# Patient Record
Sex: MALE | Race: OTHER
[De-identification: names, ages, dates, MRNs, and addresses within clinical notes are randomized per-mention and may not be internally consistent; named-entity substitution may affect disease eponyms.]

---

## 2019-10-07 ENCOUNTER — HOSPITAL ENCOUNTER (OUTPATIENT)
Dept: HOSPITAL 74 - JER | Age: 49
Setting detail: OBSERVATION
LOS: 1 days | Discharge: HOME | End: 2019-10-08
Attending: INTERNAL MEDICINE | Admitting: INTERNAL MEDICINE
Payer: SELF-PAY

## 2019-10-07 VITALS — BODY MASS INDEX: 22 KG/M2

## 2019-10-07 DIAGNOSIS — I10: ICD-10-CM

## 2019-10-07 DIAGNOSIS — E78.5: ICD-10-CM

## 2019-10-07 DIAGNOSIS — K44.9: ICD-10-CM

## 2019-10-07 DIAGNOSIS — Z87.891: ICD-10-CM

## 2019-10-07 DIAGNOSIS — Z88.6: ICD-10-CM

## 2019-10-07 DIAGNOSIS — Z79.82: ICD-10-CM

## 2019-10-07 DIAGNOSIS — Z79.4: ICD-10-CM

## 2019-10-07 DIAGNOSIS — E11.9: ICD-10-CM

## 2019-10-07 DIAGNOSIS — K29.60: ICD-10-CM

## 2019-10-07 DIAGNOSIS — R07.89: Primary | ICD-10-CM

## 2019-10-07 LAB
ALBUMIN SERPL-MCNC: 4 G/DL (ref 3.4–5)
ALP SERPL-CCNC: 102 U/L (ref 45–117)
ALT SERPL-CCNC: 25 U/L (ref 13–61)
ANION GAP SERPL CALC-SCNC: 6 MMOL/L (ref 8–16)
AST SERPL-CCNC: 13 U/L (ref 15–37)
BASOPHILS # BLD: 0.3 % (ref 0–2)
BILIRUB SERPL-MCNC: 0.8 MG/DL (ref 0.2–1)
BUN SERPL-MCNC: 11.8 MG/DL (ref 7–18)
CALCIUM SERPL-MCNC: 9.2 MG/DL (ref 8.5–10.1)
CHLORIDE SERPL-SCNC: 103 MMOL/L (ref 98–107)
CO2 SERPL-SCNC: 30 MMOL/L (ref 21–32)
CREAT SERPL-MCNC: 0.7 MG/DL (ref 0.55–1.3)
DEPRECATED RDW RBC AUTO: 12.3 % (ref 11.9–15.9)
EOSINOPHIL # BLD: 0.7 % (ref 0–4.5)
GLUCOSE SERPL-MCNC: 76 MG/DL (ref 74–106)
HCT VFR BLD CALC: 45.3 % (ref 35.4–49)
HGB BLD-MCNC: 15.7 GM/DL (ref 11.7–16.9)
INR BLD: 1.07 (ref 0.83–1.09)
LYMPHOCYTES # BLD: 36.4 % (ref 8–40)
MCH RBC QN AUTO: 31.4 PG (ref 25.7–33.7)
MCHC RBC AUTO-ENTMCNC: 34.7 G/DL (ref 32–35.9)
MCV RBC: 90.4 FL (ref 80–96)
MONOCYTES # BLD AUTO: 7 % (ref 3.8–10.2)
NEUTROPHILS # BLD: 55.6 % (ref 42.8–82.8)
PLATELET # BLD AUTO: 175 K/MM3 (ref 134–434)
PMV BLD: 10.8 FL (ref 7.5–11.1)
POTASSIUM SERPLBLD-SCNC: 4.4 MMOL/L (ref 3.5–5.1)
PROT SERPL-MCNC: 7 G/DL (ref 6.4–8.2)
PT PNL PPP: 12.6 SEC (ref 9.7–13)
RBC # BLD AUTO: 5.01 M/MM3 (ref 4–5.6)
SODIUM SERPL-SCNC: 140 MMOL/L (ref 136–145)
WBC # BLD AUTO: 7.5 K/MM3 (ref 4–10)

## 2019-10-07 PROCEDURE — G0378 HOSPITAL OBSERVATION PER HR: HCPCS

## 2019-10-07 NOTE — PDOC
Attending Attestation





- Resident


Resident Name: José Manuel Linnth





- ED Attending Attestation


I have performed the following: I have examined & evaluated the patient, The 

case was reviewed & discussed with the resident, I agree w/resident's findings 

& plan, Exceptions are as noted





- HPI


HPI: 





10/07/19 15:32


Iron 49-year-old female male history of hypertension and diabetes here today 

complaining of palpitations chest pressure and chest pain.  Patient states he 

has been getting it often over the last few days.  States it is worse with 

certain movements such as going from a lying to a standing in addition to 

rolling over in his bed.  He describes capitation's or irregular heartbeats in 

addition to a pressure-like sensation.  Denies feeling short of breath no 

associated nausea or vomiting.  Has had a previous cardiac work-up including a 

stress test which he had one year and a half ago in [facility] and a 

previous Holter monitor.  His cardiologist is in [place].  Patient is here 

visiting currently for 11 days denies any leg swelling pain is not pleuritic no 

history of blood clots no family history of heart disease





- Physicial Exam


PE: 





10/07/19 15:34


Awake alert no acute distress lungs are clear bilaterally heart is regular with 

any murmurs rubs or gallops abdomen is soft nontender extremities are warm and 

well-perfused skin is warm and dry patient has 2+ symmetric pulses bilaterally 

neurologically is alert and oriented x3 skin is warm and dry





- Medical Decision Making





10/07/19 15:34


49-year-old male history of diabetes hypertension with a negative prior cardiac 

work-up 1 year and a half ago here today complaining of palpitations and chest 

pressure.  Plan CBC CMP troponin EKG chest x-ray.  Patient does have a recent 

negative work-up however will at least require a 4-hour troponin.  Aspirin 

given here in the ED EKG with normal sinus rhythm no ST elevation





**Heart Score/ECG Review





- History


History: Moderately suspicious





- Electrocardiogram


EKG: Non specific repolarization disturbance





- Age


Age: 45-65





- Risk Factors


Risk Factors Heart Score: Yes Hx Hypertension, Yes Hx Diabetes, Yes Smoking 

History


Based on the list above the patient has:: >/=3 risk factors or Hx 

atherosclerotic disease





- Troponin


Troponin: </= normal limit





- Score


Heart Score - Total: 5


  ** #1


ECG reviewed & interpreted by me at: 15:40


General ECG Interpretation: Sinus Rhythm, Normal Rate (61), Normal Intervals, 

No acute ischemic changes

## 2019-10-07 NOTE — EKG
Test Reason : 

Blood Pressure : ***/*** mmHG

Vent. Rate : 061 BPM     Atrial Rate : 061 BPM

   P-R Int : 150 ms          QRS Dur : 092 ms

    QT Int : 388 ms       P-R-T Axes : 061 071 067 degrees

   QTc Int : 390 ms

 

NORMAL SINUS RHYTHM

NORMAL ECG

NO PREVIOUS ECGS AVAILABLE

Confirmed by WILLIE WALLACE MD ([id number]) on 10/7/2019 4:12:44 PM

 

Referred By:             Confirmed By:WILLIE WALLACE MD

## 2019-10-07 NOTE — PDOC
History of Present Illness





- General


Chief Complaint: Palpitations


Stated Complaint: CHEST PAIN


Time Seen by Provider: 10/07/19 13:06





Past History





- Past Medical History


Allergies/Adverse Reactions: 


 Allergies











Allergy/AdvReac Type Severity Reaction Status Date / Time


 


tramadol Allergy   Verified 10/07/19 12:56











Home Medications: 


Ambulatory Orders





Aspirin 81 mg PO DAILY 10/07/19 


Insulin (Levemir) [Levemir Vial] 45 units SQ BID 10/07/19 


Losartan Potassium 50 mg PO DAILY 10/07/19 


Omeprazole 20 mg PO DAILY 10/07/19 








COPD: No


Diabetes: Yes


HTN: Yes





- Psycho Social/Smoking Cessation Hx


Smoking History: Current some day smoker


Have you smoked in the past 12 months: Yes


Number of Cigarettes Smoked Daily: 1


Information on smoking cessation initiated: No


Hx Alcohol Use: No


Drug/Substance Use Hx: No





*Physical Exam





- Vital Signs


 Last Vital Signs











Temp Pulse Resp BP Pulse Ox


 


 97.8 F   64   18   131/75   99 


 


 10/07/19 12:59  10/07/19 12:59  10/07/19 12:59  10/07/19 12:59  10/07/19 12:59














**Heart Score/ECG Review





- History


History: Moderately suspicious





- Electrocardiogram


EKG: Normal





- Age


Age: 45-65





- Risk Factors


Risk Factors Heart Score: Yes Hx Hypercholesterolemia, Yes Hx Hypertension, Yes 

Smoking History


Based on the list above the patient has:: >/=3 risk factors or Hx 

atherosclerotic disease





- Troponin


Troponin: </= normal limit





- Score


Heart Score - Total: 4





ED Treatment Course





- LABORATORY


CBC & Chemistry Diagram: 


 10/07/19 14:20





 10/07/19 14:40





Medical Decision Making





- Medical Decision Making


HPI: 


48yo M with PMH of HTN, HLD, smoking, esosive gastritis, hiatal hernia 

presenting with chest pain x 12 days. The pain is described as  "pressure" and 

rated 4/10. Patient also endorses tachycardia, lightheadedness, shortness of 

breath, and a sensation of a bounding pressure in his neck. He has had this 

sensation before but it is now more severe. Was evaluated in [place] about 17 

days ago with a negative workup. No hemoptysis, no recent surgical history, no 

recent immobilization, no hormone use, no history of DVT or PE. Denies fever or 

chils. 





PCP: in [place]





ROS:


Constitutional: no fever, no chills


HEENT: no throat pain, no dysphagia


Cardiovascular: +chest pain, +palpitations


Respiratory: no cough, +shortness of breath


Gastrointestinal: +abdominal pain, no nausea


Genitourinary: no dysuria, no hematuria


Musculoskeletal: no myalgia, no arthralgia


Skin: no rash, no itching


Neurologic: +lightheadedness, +weakness





PE: 


General: Awake, alert, and fully oriented, in no acute distress


Head: No signs of trauma


Eyes: EOMI, sclera anicteric


ENT: Moist mucus membranes


Neck: Normal ROM, supple


Lungs: Lungs clear, Normal breath sounds


Cardio: Regular rhythm, S1 and S2 present


Abdomen: Soft, nontender. No guarding, no rebound, no masses


Extremities: Normal range of motion, Distal pulses present, No calf tenderness


SKIN: Warm, Dry, normal turgor


Neurologic: Cranial nerves II through XII grossly intact. Normal speech





ED Course/MDM: 


DDX including but not limited to ACS, PE, PNA, anemia, metabolic derangement


Labs, EKG, CXR





EKG: rate 61, QTc 390. NSR


10/07/19 13:09





 CBC











WBC  7.5 K/mm3 (4.0-10.0)   10/07/19  14:20    


 


RBC  5.01 M/mm3 (4.00-5.60)   10/07/19  14:20    


 


Hgb  15.7 GM/dL (11.7-16.9)   10/07/19  14:20    


 


Hct  45.3 % (35.4-49)   10/07/19  14:20    


 


MCV  90.4 fl (80-96)   10/07/19  14:20    


 


MCH  31.4 pg (25.7-33.7)   10/07/19  14:20    


 


MCHC  34.7 g/dl (32.0-35.9)   10/07/19  14:20    


 


RDW  12.3 % (11.9-15.9)   10/07/19  14:20    


 


Plt Count  175 K/MM3 (134-434)   10/07/19  14:20    


 


MPV  10.8 fl (7.5-11.1)   10/07/19  14:20    


 


Absolute Neuts (auto)  4.2 K/mm3 (1.5-8.0)   10/07/19  14:20    


 


Neutrophils %  55.6 % (42.8-82.8)   10/07/19  14:20    


 


Lymphocytes %  36.4 % (8-40)   10/07/19  14:20    


 


Monocytes %  7.0 % (3.8-10.2)   10/07/19  14:20    


 


Eosinophils %  0.7 % (0-4.5)   10/07/19  14:20    


 


Basophils %  0.3 % (0-2.0)   10/07/19  14:20    


 


Nucleated RBC %  0 % (0-0)   10/07/19  14:20    








No leukocytosis





 CMP











Sodium  140 mmol/L (136-145)   10/07/19  14:40    


 


Potassium  4.4 mmol/L (3.5-5.1)   10/07/19  14:40    


 


Chloride  103 mmol/L ()   10/07/19  14:40    


 


Carbon Dioxide  30 mmol/L (21-32)   10/07/19  14:40    


 


Anion Gap  6 MMOL/L (8-16)  L  10/07/19  14:40    


 


BUN  11.8 mg/dL (7-18)   10/07/19  14:40    


 


Creatinine  0.7 mg/dL (0.55-1.3)   10/07/19  14:40    


 


Est GFR (CKD-EPI)AfAm  127.53   10/07/19  14:40    


 


Est GFR (CKD-EPI)NonAf  110.04   10/07/19  14:40    


 


Random Glucose  76 mg/dL ()   10/07/19  14:40    


 


Calcium  9.2 mg/dL (8.5-10.1)   10/07/19  14:40    


 


Total Bilirubin  0.8 mg/dL (0.2-1)   10/07/19  14:40    


 


AST  13 U/L (15-37)  L  10/07/19  14:40    


 


ALT  25 U/L (13-61)   10/07/19  14:40    


 


Alkaline Phosphatase  102 U/L ()   10/07/19  14:40    


 


Creatine Kinase  63 U/L ()   10/07/19  14:20    


 


Troponin I  < 0.02 ng/ml (0.00-0.05)   10/07/19  14:20    


 


Total Protein  7.0 g/dl (6.4-8.2)   10/07/19  14:40    


 


Albumin  4.0 g/dl (3.4-5.0)   10/07/19  14:40    


 


TSH  0.23 uIU/ml (0.358-3.74)  L  10/07/19  14:20    








Electrolytes unremarkable


Cr normal


Tpn undetectable


TSH is low


We will add on T3 and T4





Discussed case with Dr. Junior who accepted patient for telemetry observation 

under herself. 


10/07/19 16:27





CXR, as read by radiology: "There are no prior studies for comparison. There 

are clear lungs, normal mediastinum and sharp angles. The bones and soft 

tissues are intact. Impression: No acute chest pathology. "








Discharge





- Discharge Information


Problems reviewed: Yes


Clinical Impression/Diagnosis: 


Chest pain


Qualifiers:


 Chest pain type: unspecified Qualified Code(s): R07.9 - Chest pain, unspecified





Condition: Guarded


Disposition: HOME





- Admission


Yes





- Follow up/Referral





- Patient Discharge Instructions





- Post Discharge Activity

## 2019-10-08 VITALS — HEART RATE: 77 BPM | DIASTOLIC BLOOD PRESSURE: 79 MMHG | SYSTOLIC BLOOD PRESSURE: 110 MMHG

## 2019-10-08 VITALS — TEMPERATURE: 97.5 F

## 2019-10-08 NOTE — ECHO
Version:  1



Name:  JERMAIN YING                        Exam: Adult Echocardiogram

MRN:  [id number]                                       Study Date:  10/08/2019, 11:07 AM

Age:  49 Years



 ____________________________________________________________________________________________________
__________



MMode/2D Measurements & Calculations





IVSd: 0.88 cm                                          LVIDs: 2.7 cm

LVIDd: 4.5 cm

LVPWd: 0.97 cm

LVOT diam: 2.12 cm                                     Ao root diam: 2.7 cm

                                                       LA dimension: 2.9 cm



Doppler Measurements & Calculations



MV E max nir: 67.6 cm/sec                              Med E/e':  8.0

MV A max nir: 57.8 cm/sec                              Med Peak E' Nir:  8.5 cm/sec

MV E/A: 1.17

Lat E/e':  5.3

Lat Peak E' Nir:  12.7 cm/sec

Ao max P.8 mmHg                                    NYDIA(I,D): 2.8 cm

Ao mean P.48 mmHg                                  LV V1 mean: 58.1 cm/sec

Ao V2 max: 109.1 cm/sec                                LV V1 mean P.56 mmHg

                                                       TR max nir: 188.7 cm/sec

                                                       TR max P.5 mmHg



 Left Ventricle

 The left ventricular size, thickness and function are normal. Ejection Fraction = 70%.

 Right Ventricle

 The right ventricle is normal in size and function.

 Atria

 Normal left and right atrial size and function.

 Mitral Valve

 The mitral valve is normal in structure and function.

 Tricuspid Valve

 The tricuspid valve is normal in structure and function. There is mild tricuspid regurgitation.

 Aortic Valve

 The aortic valve is normal in structure and function. Trace aortic regurgitation.

 Pulmonic Valve

 The pulmonic valve is normal in structure and function.

 Great Vessels

 The aortic root is normal size. Normal aortic arch, descending and ascending aorta.

 Pericardium/Pleura

 There is no pericardial effusion.







 Summary Statements

 The left ventricular size, thickness and function are normal

 Ejection Fraction = 70%.

 The right ventricle is normal in size and function.

 Normal left and right atrial size and function.

 The mitral valve is normal in structure and function.

 The tricuspid valve is normal in structure and function.

 There is mild tricuspid regurgitation.

 The aortic valve is normal in structure and function.

 Trace aortic regurgitation.

 The pulmonic valve is normal in structure and function.

 The aortic root is normal size.

 Normal aortic arch, descending and ascending aorta

 There is no pericardial effusion.





                    Ochoa Borja        10/08/2019, 11:16 AM

 Ordering Physician:  BRIAN BALL

 Referring Physician:  BRIAN BALL

 Performed By:  Muna Mills

## 2019-10-08 NOTE — HP
Admitting History and Physical





- Primary Care Physician


PCP: Arben Junior





- Admission


History of Present Illness: 





Iron 49-year-old female male history of hypertension and diabetes here today 

complaining of palpitations chest pressure and chest pain.  Patient states he 

has been getting it often over the last few days.  States it is worse with 

certain movements such as going from a lying to a standing in addition to 

rolling over in his bed.  He describes capitation's or irregular heartbeats in 

addition to a pressure-like sensation.  Denies feeling short of breath no 

associated nausea or vomiting.  Has had a previous cardiac work-up including a 

stress test which he had one year and a half ago in [facility] and a 

previous Holter monitor.  His cardiologist is in [place].  Patient is here 

visiting currently for 11 days denies any leg swelling pain is not pleuritic no 

history of blood clots no family history of heart disease














- Past Medical History


Cardiovascular: Yes: HTN, Hyperlipdemia


Gastrointestinal: Yes: Hiatal Hernia, Other (Erosive gastritis)





- Smoking History


Smoking history: Current some day smoker


Have you smoked in the past 12 months: Yes


Aproximately how many cigarettes per day: 1





- Alcohol/Substance Use


Hx Alcohol Use: Yes (social)





Home Medications





- Allergies


Allergies/Adverse Reactions: 


 Allergies











Allergy/AdvReac Type Severity Reaction Status Date / Time


 


tramadol Allergy   Verified 10/07/19 12:56














- Home Medications


Home Medications: 


Ambulatory Orders





Aspirin 81 mg PO DAILY 10/07/19 


Insulin (Levemir) [Levemir Vial] 45 units SQ BID 10/07/19 


Losartan Potassium 50 mg PO DAILY 10/07/19 


Omeprazole 20 mg PO DAILY 10/07/19 











Physical Examination


Vital Signs: 


 Vital Signs











Temperature  97.5 F L  10/08/19 09:25


 


Pulse Rate  63   10/08/19 09:25


 


Respiratory Rate  17   10/08/19 09:25


 


Blood Pressure  117/71   10/08/19 09:25


 


O2 Sat by Pulse Oximetry (%)  99   10/08/19 10:20











Constitutional: Yes: No Distress


HENT: Yes: Atraumatic


Neck: Yes: Supple


Cardiovascular: Yes: Regular Rate and Rhythm


Respiratory: Yes: CTA Bilaterally


Gastrointestinal: Yes: Normal Bowel Sounds


Extremities: Yes: WNL


Neurological: Yes: Alert, Oriented


Labs: 


 CBC, BMP





 10/07/19 14:20 





 10/07/19 14:40 











Problem List





- Problems


(1) Chest pain


Assessment/Plan: 


tele monitoring


fu cardiac profile and troponin


cardiology consult


Code(s): R07.9 - CHEST PAIN, UNSPECIFIED   


Qualifiers: 


   Chest pain type: unspecified   Qualified Code(s): R07.9 - Chest pain, 

unspecified   





(2) HTN (hypertension)


Assessment/Plan: 


continue meds





Code(s): I10 - ESSENTIAL (PRIMARY) HYPERTENSION   





(3) Hypercholesterolemia


Code(s): E78.00 - PURE HYPERCHOLESTEROLEMIA, UNSPECIFIED   





(4) Diabetes


Assessment/Plan: 


insulin and bgms


Code(s): E11.9 - TYPE 2 DIABETES MELLITUS WITHOUT COMPLICATIONS   





Assessment/Plan





 Laboratory Tests











  10/07/19 10/07/19 10/07/19





  14:20 14:20 14:20


 


WBC   7.5 


 


RBC   5.01 


 


Hgb   15.7 


 


Hct   45.3 


 


MCV   90.4 


 


MCH   31.4 


 


MCHC   34.7 


 


RDW   12.3 


 


Plt Count   175 


 


MPV   10.8 


 


Absolute Neuts (auto)   4.2 


 


Neutrophils %   55.6 


 


Lymphocytes %   36.4 


 


Monocytes %   7.0 


 


Eosinophils %   0.7 


 


Basophils %   0.3 


 


Nucleated RBC %   0 


 


PT with INR    12.60


 


INR    1.07


 


Sodium   


 


Potassium   


 


Chloride   


 


Carbon Dioxide   


 


Anion Gap   


 


BUN   


 


Creatinine   


 


Est GFR (CKD-EPI)AfAm   


 


Est GFR (CKD-EPI)NonAf   


 


Random Glucose   


 


Calcium   


 


Total Bilirubin   


 


AST   


 


ALT   


 


Alkaline Phosphatase   


 


Creatine Kinase  63  


 


Troponin I  < 0.02  


 


Total Protein   


 


Albumin   


 


TSH   


 


Free T4   














  10/07/19 10/07/19 10/07/19





  14:20 14:40 17:20


 


WBC   


 


RBC   


 


Hgb   


 


Hct   


 


MCV   


 


MCH   


 


MCHC   


 


RDW   


 


Plt Count   


 


MPV   


 


Absolute Neuts (auto)   


 


Neutrophils %   


 


Lymphocytes %   


 


Monocytes %   


 


Eosinophils %   


 


Basophils %   


 


Nucleated RBC %   


 


PT with INR   


 


INR   


 


Sodium   140 


 


Potassium   4.4 


 


Chloride   103 


 


Carbon Dioxide   30 


 


Anion Gap   6 L 


 


BUN   11.8 


 


Creatinine   0.7 


 


Est GFR (CKD-EPI)AfAm   127.53 


 


Est GFR (CKD-EPI)NonAf   110.04 


 


Random Glucose   76 


 


Calcium   9.2 


 


Total Bilirubin   0.8 


 


AST   13 L 


 


ALT   25 


 


Alkaline Phosphatase   102 


 


Creatine Kinase   


 


Troponin I    < 0.02


 


Total Protein   7.0 


 


Albumin   4.0 


 


TSH  0.23 L  


 


Free T4   1.14 








Active Medications











Generic Name Dose Route Start Last Admin





  Trade Name Freq  PRN Reason Stop Dose Admin


 


Aspirin  81 mg  10/08/19 12:00  10/08/19 12:24





  Asa -  PO   81 mg





  DAILY ARIE   Administration





     





     





     





     


 


Losartan Potassium  50 mg  10/08/19 12:15  10/08/19 12:24





  Cozaar -  PO   50 mg





  DAILY ARIE   Administration

## 2019-10-08 NOTE — CON.CARD
Consult


Consult Specialty:: Cardiology


Referred by:: Dr. Junior


Reason for Consultation:: Cardiac evaluation





- History of Present Illness


Chief Complaint: Chest pain


History of Present Illness: 








Patient is a 49 year old male with underlying history of HTN, 

hypercholesterolemia, erosive gastritis and hiatal hernia who presents with 

complaints of mid sternal chest discomfort (pressure) for past 6-7 days 

intermittently. He also complains of left sided weakness intermittently and 

palpitations intermittently. He denies headache or lightheadedness. He denies 

fever or chills. He denies nausea, vomiting, diarrhea or abdominal pain. He 

denies paroxysmal nocturnal dyspnea or orthopnea. He resides in [place] where 

he presented to a local hospital in [place] ([facility]) and had cardiac 

work up including stress testing. He has come to [place] in the process of possible 

relocation.





- History Source


History Provided By: Patient, Medical Record


Limitations to Obtaining History: No Limitations





- Past Medical History


Cardio/Vascular: Yes: HTN, Hyperlipdemia


Gastrointestinal: Yes: Hiatal Hernia, Other (Erosive gastritis)





- Alcohol/Substance Use


Hx Alcohol Use: Yes (social)





- Smoking History


Smoking history: Current some day smoker


Have you smoked in the past 12 months: Yes


Aproximately how many cigarettes per day: 1





Home Medications





- Allergies


Allergies/Adverse Reactions: 


 Allergies











Allergy/AdvReac Type Severity Reaction Status Date / Time


 


tramadol Allergy   Verified 10/07/19 12:56














- Home Medications


Home Medications: 


Ambulatory Orders





Aspirin 81 mg PO DAILY 10/07/19 


Insulin (Levemir) [Levemir Vial] 45 units SQ BID 10/07/19 


Losartan Potassium 50 mg PO DAILY 10/07/19 


Omeprazole 20 mg PO DAILY 10/07/19 











Review of Systems





- Review of Systems


Constitutional: denies: Chills, Fever


Cardiovascular: reports: Chest Pain, Palpitations.  denies: Shortness of Breath


Respiratory: denies: Cough, Hemoptysis, Orthopnea, PND, SOB, SOB on Exertion


Gastrointestinal: denies: Abdominal Pain, Constipation, Diarrhea, Melena, Nausea

, Rectal Bleeding, Vomiting


Genitourinary: denies: Dysuria, Hematuria


Musculoskeletal: denies: Joint Pain


Neurological: denies: Dizziness, Headache, Seizure, Syncope


Vital Signs: 


 Vital Signs











Temperature  97.5 F L  10/08/19 09:25


 


Pulse Rate  63   10/08/19 09:25


 


Respiratory Rate  17   10/08/19 09:25


 


Blood Pressure  117/71   10/08/19 09:25


 


O2 Sat by Pulse Oximetry (%)  99   10/08/19 10:20











Eyes: Yes: PERRL


HENT: Yes: Atraumatic


Neck: Yes: Supple


Respiratory: Yes: CTA Bilaterally


Gastrointestinal: Yes: Normal Bowel Sounds, Soft.  No: Tenderness


Cardiovascular: Yes: Regular Rate and Rhythm


JVD: No


Carotid Bruit: No


PMI: Non-Displaced


Heart Sounds: Yes: S1, S2


Murmur: No: Systolic Murmur, Diastolic Murmur


Edema: No





- Other Data


Labs, Other Data: 


 CBC, BMP





 10/07/19 14:20 





 10/07/19 14:40 





 INR, PTT











INR  1.07  (0.83-1.09)   10/07/19  14:20    








 Troponin, BNP











  10/07/19 10/07/19





  14:20 17:20


 


Troponin I  < 0.02  < 0.02








 








Sinus rhythm with no ST-T abnormality





Imaging





- Results


Chest X-ray: Report Reviewed (Unremarkable)


EKG: Report Reviewed





Problem List





- Problems


(1) HTN (hypertension)


Code(s): I10 - ESSENTIAL (PRIMARY) HYPERTENSION   





(2) Hypercholesterolemia


Code(s): E78.00 - PURE HYPERCHOLESTEROLEMIA, UNSPECIFIED   





(3) Erosive gastritis


Code(s): K29.60 - OTHER GASTRITIS WITHOUT BLEEDING   





(4) Hiatal hernia


Code(s): K44.9 - DIAPHRAGMATIC HERNIA WITHOUT OBSTRUCTION OR GANGRENE   





(5) Chest pain


Code(s): R07.9 - CHEST PAIN, UNSPECIFIED   


Qualifiers: 


   Chest pain type: unspecified   Qualified Code(s): R07.9 - Chest pain, 

unspecified   





Assessment/Plan





1. Chest pain syndrome, atypical with underlying history of erosive gastritis 

and hiatal hernia


2. HTN


3. Hypercholesterolemia





PLAN:


1. Troponins are negative


2. Echocardiography to assess LV/RV and valvular function


3. Continue Losartan


4. ASA if not contraindicated


5. Obtain records from hospital in [place] for further cardiac history 





Further plans are to follow


Calixto Khan MD

## 2019-10-08 NOTE — DS
Physical Examination


Vital Signs: 


 Vital Signs











Temperature  97.5 F L  10/08/19 09:25


 


Pulse Rate  71   10/08/19 12:24


 


Respiratory Rate  17   10/08/19 12:24


 


Blood Pressure  118/78   10/08/19 12:24


 


O2 Sat by Pulse Oximetry (%)  100   10/08/19 12:24











Labs: 


 CBC, BMP





 10/07/19 14:20 





 10/07/19 14:40 











Discharge Summary


Problems reviewed: Yes


Reason For Visit: CHEST PAIN


Current Active Problems





Chest pain (Acute)


Diabetes (Acute)


Erosive gastritis (Acute)


HTN (hypertension) (Acute)


Hiatal hernia (Acute)


Hypercholesterolemia (Acute)








Condition: Guarded





- Instructions


Diet, Activity, Other Instructions: 


see cardiology as out patient for further workup


Referrals: 


Calixto Khan MD [Staff Physician] - 





- Home Medications


Comprehensive Discharge Medication List: 


Ambulatory Orders





Aspirin 81 mg PO DAILY 10/07/19 


Insulin (Levemir) [Levemir Vial] 45 units SQ BID 10/07/19 


Losartan Potassium 50 mg PO DAILY 10/07/19 


Omeprazole 20 mg PO DAILY 10/07/19 





dc home


fu cardiology as out pt